# Patient Record
(demographics unavailable — no encounter records)

---

## 2025-02-25 NOTE — HISTORY OF PRESENT ILLNESS
[FreeTextEntry1] : 54 yo man with PMHx who presents as a new patient  02/26/25 Chest pain sometimes with exertion x 2 weeks, c/w typical angina

## 2025-02-25 NOTE — DISCUSSION/SUMMARY
[FreeTextEntry1] : 54 yo man with PMHx who presents as a new patient  Assessment: 1. 2. 3. 4.   Plan: 1. TTE & CT coronary with IV contrast 2. 3. 4.   During non face-to-face time, I reviewed relevant portions of the patients medical record. During face-to-face time, I took a relevant history and examined the patient. I also explained differential diagnoses, relevant cardiac diagnoses, workup, and management plan, which required a moderate level of medical decision making. I answered all questions related to the patient's medical conditions.   Jada SHARMA (John)  of Cardiology [facility] [employer] [facility] at [facility]